# Patient Record
Sex: MALE | Race: WHITE | NOT HISPANIC OR LATINO | Employment: FULL TIME | URBAN - NONMETROPOLITAN AREA
[De-identification: names, ages, dates, MRNs, and addresses within clinical notes are randomized per-mention and may not be internally consistent; named-entity substitution may affect disease eponyms.]

---

## 2017-08-31 ENCOUNTER — OFFICE VISIT (OUTPATIENT)
Dept: UROLOGY | Facility: CLINIC | Age: 27
End: 2017-08-31

## 2017-08-31 VITALS
DIASTOLIC BLOOD PRESSURE: 91 MMHG | WEIGHT: 300 LBS | HEIGHT: 68 IN | SYSTOLIC BLOOD PRESSURE: 154 MMHG | BODY MASS INDEX: 45.47 KG/M2 | HEART RATE: 86 BPM

## 2017-08-31 DIAGNOSIS — N20.1 URETERAL CALCULUS: ICD-10-CM

## 2017-08-31 DIAGNOSIS — N20.0 KIDNEY STONES: Primary | ICD-10-CM

## 2017-08-31 LAB
BILIRUB BLD-MCNC: NEGATIVE MG/DL
CLARITY, POC: CLEAR
COLOR UR: YELLOW
GLUCOSE UR STRIP-MCNC: NEGATIVE MG/DL
KETONES UR QL: NEGATIVE
LEUKOCYTE EST, POC: NEGATIVE
NITRITE UR-MCNC: NEGATIVE MG/ML
PH UR: 5.5 [PH] (ref 5–8)
PROT UR STRIP-MCNC: ABNORMAL MG/DL
RBC # UR STRIP: ABNORMAL /UL
SP GR UR: 1.02 (ref 1–1.03)
UROBILINOGEN UR QL: NORMAL

## 2017-08-31 PROCEDURE — 81002 URINALYSIS NONAUTO W/O SCOPE: CPT | Performed by: UROLOGY

## 2017-08-31 PROCEDURE — 99203 OFFICE O/P NEW LOW 30 MIN: CPT | Performed by: UROLOGY

## 2017-09-01 PROBLEM — N20.1 URETERAL CALCULUS: Status: ACTIVE | Noted: 2017-09-01

## 2020-09-24 ENCOUNTER — HOSPITAL ENCOUNTER (OUTPATIENT)
Dept: GENERAL RADIOLOGY | Facility: HOSPITAL | Age: 30
Discharge: HOME | End: 2020-09-24
Admitting: UROLOGY

## 2020-09-24 ENCOUNTER — OFFICE VISIT (OUTPATIENT)
Dept: UROLOGY | Facility: CLINIC | Age: 30
End: 2020-09-24

## 2020-09-24 VITALS — TEMPERATURE: 97.8 F | HEIGHT: 68 IN | BODY MASS INDEX: 43.19 KG/M2 | WEIGHT: 285 LBS

## 2020-09-24 DIAGNOSIS — N20.0 KIDNEY STONES: Primary | ICD-10-CM

## 2020-09-24 DIAGNOSIS — N20.1 URETERAL CALCULUS: ICD-10-CM

## 2020-09-24 PROCEDURE — 74018 RADEX ABDOMEN 1 VIEW: CPT | Performed by: RADIOLOGY

## 2020-09-24 PROCEDURE — 74018 RADEX ABDOMEN 1 VIEW: CPT

## 2020-09-24 PROCEDURE — 99203 OFFICE O/P NEW LOW 30 MIN: CPT | Performed by: UROLOGY

## 2020-09-24 RX ORDER — HYDROCODONE BITARTRATE AND ACETAMINOPHEN 10; 325 MG/1; MG/1
1 TABLET ORAL EVERY 6 HOURS PRN
COMMUNITY

## 2020-09-24 NOTE — PROGRESS NOTES
Chief Complaint:          Chief Complaint   Patient presents with   • Nephrolithiasis     New patient        HPI:   30 y.o. male is referred for kidney stones.  This is his second stone he was on storm [place] in [place] and very hot working outside he does not drink cola or tea but he drinks lots of water.  This was a right-sided stone he had some distal pain some testalgia some nausea with it no no fevers or chills and his KUB today is negative.  He feels much better we talked about stone prevention I told him that these films  about 80% of radio opaque stones.  I recommend observation if it continues or he has any pain or problems he is can let me know      Past Medical History:        Past Medical History:   Diagnosis Date   • Kidney stone          Current Meds:     Current Outpatient Medications   Medication Sig Dispense Refill   • HYDROcodone-acetaminophen (NORCO)  MG per tablet Take 1 tablet by mouth Every 6 (Six) Hours As Needed for Moderate Pain .       No current facility-administered medications for this visit.         Allergies:      No Known Allergies     Past Surgical History:     Past Surgical History:   Procedure Laterality Date   • KNEE SURGERY           Social History:     Social History     Socioeconomic History   • Marital status:      Spouse name: Not on file   • Number of children: Not on file   • Years of education: Not on file   • Highest education level: Not on file   Tobacco Use   • Smoking status: Never Smoker   Substance and Sexual Activity   • Alcohol use: Yes   • Drug use: No       Family History:     Family History   Problem Relation Age of Onset   • No Known Problems Father    • No Known Problems Mother        Review of Systems:     Review of Systems   Constitutional: Negative.    HENT: Negative.    Eyes: Negative.    Respiratory: Negative.    Cardiovascular: Negative.    Gastrointestinal: Negative.    Endocrine: Negative.    Genitourinary: Positive for flank  pain.   Allergic/Immunologic: Negative.    Neurological: Negative.    Hematological: Negative.    Psychiatric/Behavioral: Negative.        Physical Exam:     Physical Exam  Vitals signs and nursing note reviewed.   Constitutional:       Appearance: He is well-developed.   HENT:      Head: Normocephalic and atraumatic.   Eyes:      Conjunctiva/sclera: Conjunctivae normal.      Pupils: Pupils are equal, round, and reactive to light.   Neck:      Musculoskeletal: Normal range of motion.   Cardiovascular:      Rate and Rhythm: Normal rate and regular rhythm.      Heart sounds: Normal heart sounds.   Pulmonary:      Effort: Pulmonary effort is normal.      Breath sounds: Normal breath sounds.   Abdominal:      General: Bowel sounds are normal.      Palpations: Abdomen is soft.   Musculoskeletal: Normal range of motion.   Skin:     General: Skin is warm and dry.   Neurological:      Mental Status: He is alert and oriented to person, place, and time.      Deep Tendon Reflexes: Reflexes are normal and symmetric.   Psychiatric:         Behavior: Behavior normal.         Thought Content: Thought content normal.         Judgment: Judgment normal.         I have reviewed the following portions of the patient's history: allergies, current medications, past family history, past medical history, past social history, past surgical history, problem list and ROS and confirm it's accurate.      Procedure:       Assessment/Plan:   Renal calculus-we discussed the presence of the stone we discussed the various therapeutic options available including percutaneous nephrostolithotomy, lithotripsy.  We discussed the risks of lithotripsy including the passage of stones the development of a large string of stones in the distal ureter known as Steinstrasse.  In the 3% incidence of that we will need to proceed with a ureteroscopy for obstructing fragments.  Extremely rare incidence of renal hematoma.  And the significance of this.  We discussed  the absolute relative indicators for intervention including the presence of sepsis, and pain we cannot control is the primary need for urgent intervention.  We discussed placement of a stent if indicated and the management of the stent as well.  I believe he passed a stone his KUB was negative by my reading            Patient's Body mass index is 43.33 kg/m². BMI is above normal parameters. Recommendations include: educational material.              This document has been electronically signed by MARLENY PRATER MD September 24, 2020 13:16 EDT